# Patient Record
Sex: MALE | Race: WHITE
[De-identification: names, ages, dates, MRNs, and addresses within clinical notes are randomized per-mention and may not be internally consistent; named-entity substitution may affect disease eponyms.]

---

## 2017-02-27 ENCOUNTER — HOSPITAL ENCOUNTER (OUTPATIENT)
Dept: HOSPITAL 53 - M RAD | Age: 57
End: 2017-02-27
Attending: INTERNAL MEDICINE
Payer: COMMERCIAL

## 2017-02-27 DIAGNOSIS — J98.4: Primary | ICD-10-CM

## 2017-02-27 NOTE — REP
CT study of the chest without contrast:

 

History:  Follow-up lung nodules.

 

Comparison CT studies:  December 30, 2015, June 25, 2015, March 26, 2015.

 

Findings:  Standard noncontrast CT study of the chest again demonstrates multiple

subcentimeter noncalcified javid bronchovascular bilateral pulmonary nodules right

more so than left.  These are unchanged in size and number from the earliest

prior study dated March 25, 2015.  Two years of stability is generally considered

indicative of benign etiology.  No new nodule is seen.  There are scattered

normal sized mediastinal lymph nodes.  No pleural or pericardial effusion is

seen.  Some left coronary artery vascular calcification is seen.  No adrenal

lesion is observed.

 

Impression:

 

Multiple stable noncalcified bilateral pulmonary nodules.  No change from March 26, 2015.

 

 

Signed by

Qamar Prince MD 02/27/2017 03:36 P

## 2017-07-12 ENCOUNTER — HOSPITAL ENCOUNTER (OUTPATIENT)
Dept: HOSPITAL 53 - M LAB REF | Age: 57
End: 2017-07-12
Attending: INTERNAL MEDICINE
Payer: COMMERCIAL

## 2017-07-12 DIAGNOSIS — D50.9: Primary | ICD-10-CM

## 2017-07-12 LAB
FERRITIN SERPL-MCNC: 134 NG/ML (ref 26–388)
IRON SATN MFR SERPL: 33.4 % (ref 19.7–37.4)
TIBC SERPL-MCNC: 314 UG/DL (ref 250–450)

## 2018-03-12 ENCOUNTER — HOSPITAL ENCOUNTER (OUTPATIENT)
Dept: HOSPITAL 53 - M RAD | Age: 58
End: 2018-03-12
Attending: INTERNAL MEDICINE
Payer: COMMERCIAL

## 2018-03-12 DIAGNOSIS — F17.210: ICD-10-CM

## 2018-03-12 DIAGNOSIS — R91.8: ICD-10-CM

## 2018-03-12 DIAGNOSIS — Z12.2: Primary | ICD-10-CM

## 2019-10-16 ENCOUNTER — HOSPITAL ENCOUNTER (OUTPATIENT)
Dept: HOSPITAL 53 - M RAD | Age: 59
End: 2019-10-16
Attending: INTERNAL MEDICINE
Payer: COMMERCIAL

## 2019-10-16 DIAGNOSIS — Z12.2: Primary | ICD-10-CM

## 2019-10-17 NOTE — REP
Clinical:  Lung screening.  History smoking.

 

Comparison:  03/12/2018, 12/30/2015

 

Technique:  Axial low-dose noncontrast images from the thoracic inlet to the

upper abdomen using lung screening technique.

 

Findings:

The lung fields are well-aerated previously identified scattered

opacities/nodules remain relatively stable.  No effusion.  No pneumothorax.

Tracheobronchial tree appears patent.

 

There is a new 6 mm density in the periphery of the right lower lobe (image 68).

Along with a 4 mm density in the right upper lobe (image 24) which appears

possibly new or more prominent than prior examination.

 

Impression:

Lung-RADS category III.

There is a new 6 mm nodule in the right lower lobe as well as more prominent 4-mm

nodule in the right upper lobe.  6-month follow-up examination is recommended

based on Fleischner Society criteria.

 

 

Electronically Signed by

Phu Salazar MD 10/17/2019 05:47 A

## 2019-10-25 ENCOUNTER — HOSPITAL ENCOUNTER (OUTPATIENT)
Dept: HOSPITAL 53 - M SMT | Age: 59
End: 2019-10-25
Attending: SPECIALIST
Payer: COMMERCIAL

## 2019-10-25 DIAGNOSIS — R91.8: Primary | ICD-10-CM

## 2019-10-31 LAB
C-ANCA TITR SER IF: (no result) TITER
P-ANCA ATYPICAL TITR SER IF: (no result) TITER
P-ANCA TITR SER IF: (no result) TITER

## 2020-01-15 ENCOUNTER — HOSPITAL ENCOUNTER (EMERGENCY)
Dept: HOSPITAL 53 - M ED | Age: 60
Discharge: ANOTHER HOSPITAL | End: 2020-01-15
Payer: COMMERCIAL

## 2020-01-15 VITALS — BODY MASS INDEX: 29.66 KG/M2 | WEIGHT: 238.54 LBS | HEIGHT: 75 IN

## 2020-01-15 VITALS — SYSTOLIC BLOOD PRESSURE: 154 MMHG | DIASTOLIC BLOOD PRESSURE: 95 MMHG

## 2020-01-15 DIAGNOSIS — J98.59: ICD-10-CM

## 2020-01-15 DIAGNOSIS — Z87.891: ICD-10-CM

## 2020-01-15 DIAGNOSIS — K21.9: ICD-10-CM

## 2020-01-15 DIAGNOSIS — E07.9: Primary | ICD-10-CM

## 2020-01-15 DIAGNOSIS — Z79.899: ICD-10-CM

## 2020-01-15 DIAGNOSIS — J38.01: ICD-10-CM

## 2020-01-15 DIAGNOSIS — Z88.0: ICD-10-CM

## 2020-01-15 DIAGNOSIS — R00.0: ICD-10-CM

## 2020-01-15 DIAGNOSIS — R63.4: ICD-10-CM

## 2020-01-15 LAB
ALBUMIN SERPL BCG-MCNC: 3.3 GM/DL (ref 3.2–5.2)
ALBUMIN SERPL BCG-MCNC: 3.4 GM/DL (ref 3.2–5.2)
ALT SERPL W P-5'-P-CCNC: 18 U/L (ref 12–78)
ALT SERPL W P-5'-P-CCNC: 21 U/L (ref 12–78)
AMYLASE SERPL-CCNC: 24 U/L (ref 25–115)
APTT BLD: 33.8 SECONDS (ref 25–38.4)
BASOPHILS # BLD AUTO: 0.1 10^3/UL (ref 0–0.2)
BASOPHILS NFR BLD AUTO: 0.8 % (ref 0–1)
BILIRUB CONJ SERPL-MCNC: 0.2 MG/DL (ref 0–0.2)
BILIRUB SERPL-MCNC: 0.5 MG/DL (ref 0.2–1)
BILIRUB SERPL-MCNC: 0.6 MG/DL (ref 0.2–1)
BUN SERPL-MCNC: 10 MG/DL (ref 7–18)
CALCIUM SERPL-MCNC: 9.5 MG/DL (ref 8.5–10.1)
CHLORIDE SERPL-SCNC: 102 MEQ/L (ref 98–107)
CO2 SERPL-SCNC: 28 MEQ/L (ref 21–32)
CREAT SERPL-MCNC: 0.69 MG/DL (ref 0.7–1.3)
EOSINOPHIL # BLD AUTO: 0.3 10^3/UL (ref 0–0.5)
EOSINOPHIL NFR BLD AUTO: 2.6 % (ref 0–3)
GFR SERPL CREATININE-BSD FRML MDRD: > 60 ML/MIN/{1.73_M2} (ref 56–?)
GLUCOSE SERPL-MCNC: 97 MG/DL (ref 70–100)
HCT VFR BLD AUTO: 39.6 % (ref 42–52)
HGB BLD-MCNC: 12.5 G/DL (ref 13.5–17.5)
INR PPP: 1.2
LIPASE SERPL-CCNC: 51 U/L (ref 73–393)
LYMPHOCYTES # BLD AUTO: 1.3 10^3/UL (ref 1.5–5)
LYMPHOCYTES NFR BLD AUTO: 13.3 % (ref 24–44)
MCH RBC QN AUTO: 27.7 PG (ref 27–33)
MCHC RBC AUTO-ENTMCNC: 31.6 G/DL (ref 32–36.5)
MCV RBC AUTO: 87.6 FL (ref 80–96)
MONOCYTES # BLD AUTO: 0.8 10^3/UL (ref 0–0.8)
MONOCYTES NFR BLD AUTO: 8.2 % (ref 0–5)
NEUTROPHILS # BLD AUTO: 7.5 10^3/UL (ref 1.5–8.5)
NEUTROPHILS NFR BLD AUTO: 74.7 % (ref 36–66)
PLATELET # BLD AUTO: 358 10^3/UL (ref 150–450)
POTASSIUM SERPL-SCNC: 4.4 MEQ/L (ref 3.5–5.1)
PROT SERPL-MCNC: 7.2 GM/DL (ref 6.4–8.2)
PROT SERPL-MCNC: 7.3 GM/DL (ref 6.4–8.2)
PROTHROMBIN TIME: 14.9 SECONDS (ref 11.8–14)
RBC # BLD AUTO: 4.52 10^6/UL (ref 4.3–6.1)
SODIUM SERPL-SCNC: 138 MEQ/L (ref 136–145)
WBC # BLD AUTO: 10.1 10^3/UL (ref 4–10)

## 2020-01-15 PROCEDURE — 71046 X-RAY EXAM CHEST 2 VIEWS: CPT

## 2020-01-15 PROCEDURE — 70491 CT SOFT TISSUE NECK W/DYE: CPT

## 2020-01-15 PROCEDURE — 80053 COMPREHEN METABOLIC PANEL: CPT

## 2020-01-15 PROCEDURE — 85610 PROTHROMBIN TIME: CPT

## 2020-01-15 PROCEDURE — 86850 RBC ANTIBODY SCREEN: CPT

## 2020-01-15 PROCEDURE — 99285 EMERGENCY DEPT VISIT HI MDM: CPT

## 2020-01-15 PROCEDURE — 93041 RHYTHM ECG TRACING: CPT

## 2020-01-15 PROCEDURE — 82150 ASSAY OF AMYLASE: CPT

## 2020-01-15 PROCEDURE — 85730 THROMBOPLASTIN TIME PARTIAL: CPT

## 2020-01-15 PROCEDURE — 71250 CT THORAX DX C-: CPT

## 2020-01-15 PROCEDURE — 93005 ELECTROCARDIOGRAM TRACING: CPT

## 2020-01-15 PROCEDURE — 86900 BLOOD TYPING SEROLOGIC ABO: CPT

## 2020-01-15 PROCEDURE — 87040 BLOOD CULTURE FOR BACTERIA: CPT

## 2020-01-15 PROCEDURE — 83690 ASSAY OF LIPASE: CPT

## 2020-01-15 PROCEDURE — 85025 COMPLETE CBC W/AUTO DIFF WBC: CPT

## 2020-01-15 PROCEDURE — 86901 BLOOD TYPING SEROLOGIC RH(D): CPT

## 2020-01-15 PROCEDURE — 80047 BASIC METABLC PNL IONIZED CA: CPT

## 2020-01-15 RX ADMIN — SODIUM CHLORIDE SCH MLS/HR: 9 INJECTION, SOLUTION INTRAVENOUS at 14:29

## 2020-01-15 RX ADMIN — SODIUM CHLORIDE SCH MLS/HR: 9 INJECTION, SOLUTION INTRAVENOUS at 19:34

## 2020-01-15 NOTE — REP
CT CHEST WITH IV CONTRAST:

 

HISTORY:  Further evaluate mass.  Dysphagia.  Comparison CT study is from October 16, 2019.  There is also a chest CT study from February 27, 2017 as well as March 12, 2018.  Comparison is made with recent contrast enhanced soft tissue neck CT.

 

CT FINDINGS:  There is a large mass anterior and to the right of the trachea at

the thoracic inlet.  This extends from the base of the neck through the thoracic

inlet in the anterior mediastinum.  It has a craniocaudal imaging span of at

least 8.8 cm.  In transverse dimensions, it measures 8.7 x 7.2 cm in greatest

dimension.  Its margins are somewhat irregular.  There is an area in the upper

mediastinum within the mass which appears lower in attenuation and may be

necrotic.  In the base of the neck it may arise from the right lobe of the

thyroid gland, which is partially replaced by the lesion.  There is tracheal

deviation to the left.  The innominate and right subclavian artery are displaced

posteriorly.  The right carotid artery is displaced posteriorly and travels along

the posterior margin of the lesion.  Comparing the sagittal reformatted images

from prior CT in 2017, even the aortic arch is displaced somewhat posteriorly by

the lesion.  There are small nodular densities along the inferior margin, which

may be adjacent lymph nodes.  No hilar adenopathy is seen.  No endobronchial

lesion is seen.  There is no evidence of pleural or pericardial effusion.  No

adrenal lesion is observed.  There is a 5 mm hepatic cyst in the right lobe.

There is a low-density lesion in the periphery of the left lobe adjacent the

falciform ligament measuring  2.0 cm.  This may be a cyst as well.  It was not

previously present.

 

Multiple small subcentimeter pulmonary nodules are noted in the lung fields.  The

recently identified right lower lobe nodule is 9 mm today, 6 mm on October 16, 2019.  This is suspicious for a metastatic nodule.  There is a 7 mm nodule in the

right lower lobe posteriorly and laterally.  Multiple small stable scattered

pulmonary nodules are again seen otherwise unchanged.  No bony destructive

lesion.

 

IMPRESSION:

 

Large mass at the thoracic inlet extending in the anterior mediastinum from the

right lobe of the thyroid gland most compatible with aggressive thyroid

malignancy.  Other possibilities would include lymphoma and teratoma neoplasm.

Consider ultrasound-guided needle biopsy procedure and sampling.

 

 

Electronically Signed by

Qamar Prince MD 01/15/2020 06:16 P

## 2020-01-15 NOTE — REP
CHEST, TWO VIEWS:

 

Two views of the chest are performed and compared to prior radiographs and CT

scans.  There is possible right paratracheal adenopathy or mass slightly

impressing upon the trachea.  No acute infiltrate is seen.  Heart is normal in

size.  There are degenerative changes of the spine.

 

IMPRESSION:

 

Possible right paratracheal adenopathy or mass.  Recommend CT of the chest with

IV contrast.

 

 

Electronically Signed by

Wilian Foster MD 01/15/2020 05:38 P

## 2020-01-15 NOTE — ECGEPIP
[facility] - ED

                                       

                                       Test Date:    2020-01-15

Pat Name:     MIREYA CASTRO        Department:   

Patient ID:   [id number]                 Room:         -

Gender:       Male                     Technician:   carlie

:          1960               Requested By: FRANCISCO PARK

Order Number: [id number]     Reading MD:   Lorena Capellan

                                 Measurements

Intervals                              Axis          

Rate:         105                      P:            38

AK:           154                      QRS:          67

QRSD:         88                       T:            38

QT:           311                                    

QTc:          413                                    

                           Interpretive Statements

SINUS TACHYCARDIA

ABNORMAL RHYTHM ECG

NO PRIOR

Electronically Signed on 1- 18:22:52 EST by Lorena Capellan

## 2020-01-15 NOTE — REP
INDICATION:  Dysphasia

 

PROCEDURE:  CT neck with contrast

 

COMPARISON STUDIES: Lung CT 10/16/2019, CT chest 02/27/2017.

 

FINDINGS:  The right vocal cord is medially deviated, could suggest vocal cord

paralysis.  The oropharynx, nasopharynx, hypopharynx and larynx are patent.

There is gross enlargement of the right thyroid gland measures at least 5.5 cm in

transverse dimension by 7.5 cm in oblique AP dimension.  This displaces the

airway to the left and appears new from prior studies.  The inferior margin is

incompletely imaged, extends into the mediastinum and the anterior to the aorta.

 

Lung apices appear clear.  No significant lymphadenopathy.

 

IMPRESSION: Findings most suggestive of thyroid goiter on the right, appears new

from the comparison studies.  The inferior margin is not imaged on the current

study, extends into the mediastinum.  There appears to be an element of vocal

cord paralysis on the right may be secondary to the involvement of the thyroid

gland with the right laryngeal nerve.

 

 

 

 

Electronically Signed by

Moe Ding MD 01/15/2020 12:55 P